# Patient Record
Sex: FEMALE | Race: WHITE | NOT HISPANIC OR LATINO | Employment: PART TIME | URBAN - NONMETROPOLITAN AREA
[De-identification: names, ages, dates, MRNs, and addresses within clinical notes are randomized per-mention and may not be internally consistent; named-entity substitution may affect disease eponyms.]

---

## 2017-01-30 ENCOUNTER — APPOINTMENT (OUTPATIENT)
Dept: OCCUPATIONAL MEDICINE | Facility: OTHER | Age: 37
End: 2017-01-30

## 2017-01-30 PROCEDURE — 99000 SPECIMEN HANDLING OFFICE-LAB: CPT

## 2017-02-03 ENCOUNTER — HOSPITAL ENCOUNTER (EMERGENCY)
Facility: HOSPITAL | Age: 37
Discharge: HOME | End: 2017-02-03
Attending: PHYSICIAN ASSISTANT | Admitting: PHYSICIAN ASSISTANT
Payer: COMMERCIAL

## 2017-02-03 VITALS
OXYGEN SATURATION: 97 % | SYSTOLIC BLOOD PRESSURE: 119 MMHG | TEMPERATURE: 96.7 F | HEART RATE: 89 BPM | DIASTOLIC BLOOD PRESSURE: 70 MMHG | RESPIRATION RATE: 16 BRPM

## 2017-02-03 DIAGNOSIS — H10.89 OTHER CONJUNCTIVITIS OF BOTH EYES: ICD-10-CM

## 2017-02-03 LAB
GRAM STN SPEC: ABNORMAL
MICRO REPORT STATUS: ABNORMAL
SPECIMEN SOURCE: ABNORMAL

## 2017-02-03 PROCEDURE — 99213 OFFICE O/P EST LOW 20 MIN: CPT

## 2017-02-03 PROCEDURE — 99213 OFFICE O/P EST LOW 20 MIN: CPT | Performed by: PHYSICIAN ASSISTANT

## 2017-02-03 PROCEDURE — 87070 CULTURE OTHR SPECIMN AEROBIC: CPT | Performed by: PHYSICIAN ASSISTANT

## 2017-02-03 PROCEDURE — 87205 SMEAR GRAM STAIN: CPT | Performed by: PHYSICIAN ASSISTANT

## 2017-02-03 RX ORDER — CETIRIZINE HYDROCHLORIDE 10 MG/1
TABLET ORAL
Qty: 30 TABLET | Refills: 0 | Status: SHIPPED | OUTPATIENT
Start: 2017-02-03

## 2017-02-03 ASSESSMENT — ENCOUNTER SYMPTOMS
EYE ITCHING: 1
EYE DISCHARGE: 1
SORE THROAT: 0
RESPIRATORY NEGATIVE: 1
PSYCHIATRIC NEGATIVE: 1
CHILLS: 0
NEUROLOGICAL NEGATIVE: 1
FEVER: 0
EYE REDNESS: 1
CONSTITUTIONAL NEGATIVE: 1
CARDIOVASCULAR NEGATIVE: 1
SINUS PRESSURE: 0

## 2017-02-03 NOTE — ED AVS SNAPSHOT
[facility]    [address] [place] [address]    Phone:  [phone]                                       Nella Stoner   MRN: [id number]    Department:  [facility]   Date of Visit:  2/3/2017           After Visit Summary Signature Page     I have received my discharge instructions, and my questions have been answered. I have discussed any challenges I see with this plan with the nurse or doctor.    ..........................................................................................................................................  Patient/Patient Representative Signature      ..........................................................................................................................................  Patient Representative Print Name and Relationship to Patient    ..................................................               ................................................  Date                                            Time    ..........................................................................................................................................  Reviewed by Signature/Title    ...................................................              ..............................................  Date                                                            Time

## 2017-02-03 NOTE — DISCHARGE INSTRUCTIONS
- Start with ointment for 5-7 days  - WASH HANDS A TON to prevent spreading it.  - May take zyrtec as directed on the bottle for 2-6 days to help with eye redness and itching.     * If no better over the weekend, see eye doc Monday/Tuesday. We will call with culture results if change in plan is needed.

## 2017-02-03 NOTE — ED AVS SNAPSHOT
[facility]    [address]    [place] [address]    Phone:  [phone]                                       Nella Stoner   MRN: [id number]    Department:  [facility]   Date of Visit:  2/3/2017           Patient Information     Date Of Birth          1980        Your diagnoses for this visit were:     Other conjunctivitis of both eyes        You were seen by Eduardo Sosa PA.      Follow-up Information     Follow up with None.    Why:  As needed. Eye doc Monday/Tuesday        Discharge Instructions       - Start with ointment for 5-7 days  - WASH HANDS A TON to prevent spreading it.  - May take zyrtec as directed on the bottle for 2-6 days to help with eye redness and itching.     * If no better over the weekend, see eye doc Monday/Tuesday. We will call with culture results if change in plan is needed.     Discharge References/Attachments     CONJUNCTIVITIS CAUSED BY INFECTION (ENGLISH)      ED Discharge Orders     OPHTHALMOLOGY ADULT REFERRAL       Your provider has referred you to: PREFERRED PROVIDERS: Per patient preference      Please be aware that coverage of these services is subject to the terms and limitations of your health insurance plan.  Call member services at your health plan with any benefit or coverage questions.      Please bring the following with you to your appointment:    (1) Any X-Rays, CTs or MRIs which have been performed.  Contact the facility where they were done to arrange for  prior to your scheduled appointment.    (2) List of current medications  (3) This referral request   (4) Any documents/labs given to you for this referral                     Review of your medicines      START taking        Dose / Directions Last dose taken    cetirizine 10 MG tablet   Commonly known as:  zyrTEC   Quantity:  30 tablet        Take 2 tabs (20 mg) twice daily for 2-3 days, then take 1 tab (10mg) twice daily for 2-3 days for itching/hives.  "  Refills:  0        gentamicin 0.3 % ophthalmic ointment   Commonly known as:  GARAMYCIN   Dose:  0.5 inch   Quantity:  1 Tube        Place 0.5 inches into both eyes 3 times daily for 7 days   Refills:  0                Prescriptions were sent or printed at these locations (2 Prescriptions)                   [id number] - [place], [place] - [address] AT [facility] [address]   [address], [place] [address]    Telephone:  [phone]   Fax:  [phone]   Hours:                  E-Prescribed (2 of 2)         gentamicin (GARAMYCIN) 0.3 % ophthalmic ointment               cetirizine (ZYRTEC) 10 MG tablet                Procedures and tests performed during your visit     Eye Culture Aerobic Bacterial    Gram stain      Orders Needing Specimen Collection     None      Pending Results     Date and Time Order Name Status Description    2/3/2017 1154 Gram stain In process     2/3/2017 1154 Eye Culture Aerobic Bacterial In process             Pending Culture Results     Date and Time Order Name Status Description    2/3/2017 1154 Gram stain In process     2/3/2017 1154 Eye Culture Aerobic Bacterial In process             Thank you for choosing [facility]       Thank you for choosing [facility] for your care. Our goal is always to provide you with excellent care. Hearing back from our patients is one way we can continue to improve our services. Please take a few minutes to complete the written survey that you may receive in the mail after you visit with us. Thank you!        [employer] Information     [employer] lets you send messages to your doctor, view your test results, renew your prescriptions, schedule appointments and more. To sign up, go to [email or web address] . Click on \"Log in\" on the left side of the screen, which will take you to the Welcome page. Then click on \"Sign up Now\" on the right side of the page.     You will be asked to enter the access code listed below, as well as some personal " information. Please follow the directions to create your username and password.     Your access code is: [email or web address]  Expires: 2017 11:56 AM     Your access code will  in 90 days. If you need help or a new code, please call your [place] clinic or [phone].        Care EveryWhere ID     This is your Care EveryWhere ID. This could be used by other organizations to access your [place] medical records  [id number]        After Visit Summary       This is your record. Keep this with you and show to your community pharmacist(s) and doctor(s) at your next visit.

## 2017-02-03 NOTE — ED PROVIDER NOTES
History     Chief Complaint   Patient presents with     Conjunctivitis     The history is provided by the patient. No  was used.     Nella Stoner is a 36 year old female who presents with 3rd episode conjuntivitis in 4 weeks. First episode was after her son had it from . 2nd episode was last week, so she started old drops, ran out, and was prescribed more by outside provider. This cleared them up for one day. Current episode started over past 24 hrs. She reports intense itching and discomfort in addition to mattering and yellow, thick discharge bilaterally. She has some nasal congestion. NO fevers, sore throat. No vision changes other than blurry with mattering.     I have reviewed the Medications, Allergies, Past Medical and Surgical History, and Social History in the Epic system.    Review of Systems   Constitutional: Negative.  Negative for fever and chills.   HENT: Positive for congestion. Negative for ear pain, sinus pressure and sore throat.    Eyes: Positive for discharge, redness, itching and visual disturbance (blurry with mattering).   Respiratory: Negative.    Cardiovascular: Negative.    Skin: Negative.    Neurological: Negative.    Psychiatric/Behavioral: Negative.        Physical Exam   BP: 119/70 mmHg  Pulse: 89  Temp: 96.7  F (35.9  C)  Resp: 16  SpO2: 97 %  Physical Exam   Constitutional: She is oriented to person, place, and time. She appears well-developed and well-nourished. No distress (obvious bilateral conjunctivitis).   HENT:   Head: Normocephalic and atraumatic.   Right Ear: External ear normal.   Left Ear: External ear normal.   Mouth/Throat: Oropharynx is clear and moist. No oropharyngeal exudate.   Eyes: EOM are normal. Pupils are equal, round, and reactive to light. Right eye exhibits discharge. Right eye exhibits no chemosis. Left eye exhibits discharge. Left eye exhibits no chemosis. Right conjunctiva is injected. Left conjunctiva is injected.    Neck: Normal range of motion.   Cardiovascular: Normal rate.    Pulmonary/Chest: Effort normal.   Lymphadenopathy:     She has cervical adenopathy (R anterior cervical palpable, <1 cm and mobile).   Neurological: She is alert and oriented to person, place, and time.   Skin: Skin is warm and dry.   Psychiatric: She has a normal mood and affect.   Nursing note and vitals reviewed.      ED Course   Procedures       Labs Ordered and Resulted from Time of ED Arrival Up to the Time of Departure from the ED   EYE CULTURE AEROBIC BACTERIAL   GRAM STAIN       Assessments & Plan (with Medical Decision Making)     I have reviewed the nursing notes.    I have reviewed the findings, diagnosis, plan and need for follow up with the patient.    Discharge Medication List as of 2/3/2017 12:18 PM      START taking these medications    Details   gentamicin (GARAMYCIN) 0.3 % ophthalmic ointment Place 0.5 inches into both eyes 3 times daily for 7 daysDisp-1 Tube, [id number]      cetirizine (ZYRTEC) 10 MG tablet Take 2 tabs (20 mg) twice daily for 2-3 days, then take 1 tab (10mg) twice daily for 2-3 days for itching/hives., Disp-30 tablet, R-0, E-Prescribe             Final diagnoses:   Other conjunctivitis of both eyes   Due to 3rd episode in 4 weeks, a culture is obtained. Pt will start gentamicin ointment, mindful hygiene, and f/u with ophthalmology Monday/Tuesday with poor progression. She will seek attention sooner with fevers or worsening. Patient verbally educated and given appropriate education sheets for each of the diagnoses and has no questions.    Eduardo Sosa PA-C   2/3/2017   12:29 PM    2/3/2017   [facility]      Eduardo Sosa PA  02/03/17 2626

## 2017-02-03 NOTE — ED NOTES
Pt here for redness both eyes, mostly Rt. Son had pink eye recently and was treated. She stated she used his drops first time, redness went away, then returned. She went in and got drops prescribed for herself, and it now has returned again. Itchy.

## 2017-02-07 LAB
BACTERIA SPEC CULT: NORMAL
MICRO REPORT STATUS: NORMAL
SPECIMEN SOURCE: NORMAL

## 2017-05-02 ENCOUNTER — APPOINTMENT (OUTPATIENT)
Dept: OCCUPATIONAL MEDICINE | Facility: OTHER | Age: 37
End: 2017-05-02

## 2017-05-02 PROCEDURE — 99000 SPECIMEN HANDLING OFFICE-LAB: CPT

## 2017-09-07 ENCOUNTER — HOSPITAL ENCOUNTER (EMERGENCY)
Facility: HOSPITAL | Age: 37
Discharge: HOME | End: 2017-09-07
Attending: FAMILY MEDICINE | Admitting: FAMILY MEDICINE
Payer: COMMERCIAL

## 2017-09-07 VITALS
HEART RATE: 76 BPM | SYSTOLIC BLOOD PRESSURE: 109 MMHG | OXYGEN SATURATION: 99 % | RESPIRATION RATE: 16 BRPM | TEMPERATURE: 96.1 F | DIASTOLIC BLOOD PRESSURE: 38 MMHG

## 2017-09-07 DIAGNOSIS — R82.5 POSITIVE URINE DRUG SCREEN: ICD-10-CM

## 2017-09-07 DIAGNOSIS — R56.9 SEIZURE-LIKE ACTIVITY (H): ICD-10-CM

## 2017-09-07 LAB
ALBUMIN SERPL-MCNC: 3 G/DL (ref 3.4–5)
ALBUMIN UR-MCNC: 10 MG/DL
ALP SERPL-CCNC: 79 U/L (ref 40–150)
ALT SERPL W P-5'-P-CCNC: 16 U/L (ref 0–50)
AMPHETAMINES UR QL SCN: POSITIVE
ANION GAP SERPL CALCULATED.3IONS-SCNC: 5 MMOL/L (ref 3–14)
APPEARANCE UR: ABNORMAL
AST SERPL W P-5'-P-CCNC: 10 U/L (ref 0–45)
BACTERIA #/AREA URNS HPF: ABNORMAL /HPF
BARBITURATES UR QL: NEGATIVE
BASOPHILS # BLD AUTO: 0 10E9/L (ref 0–0.2)
BASOPHILS NFR BLD AUTO: 0.5 %
BENZODIAZ UR QL: NEGATIVE
BILIRUB SERPL-MCNC: 0.3 MG/DL (ref 0.2–1.3)
BILIRUB UR QL STRIP: NEGATIVE
BUN SERPL-MCNC: 10 MG/DL (ref 7–30)
CALCIUM SERPL-MCNC: 7.8 MG/DL (ref 8.5–10.1)
CANNABINOIDS UR QL SCN: NEGATIVE
CHLORIDE SERPL-SCNC: 106 MMOL/L (ref 94–109)
CO2 SERPL-SCNC: 32 MMOL/L (ref 20–32)
COCAINE UR QL: NEGATIVE
COLOR UR AUTO: YELLOW
CREAT SERPL-MCNC: 0.65 MG/DL (ref 0.52–1.04)
DIFFERENTIAL METHOD BLD: ABNORMAL
EOSINOPHIL # BLD AUTO: 0.1 10E9/L (ref 0–0.7)
EOSINOPHIL NFR BLD AUTO: 2.1 %
ERYTHROCYTE [DISTWIDTH] IN BLOOD BY AUTOMATED COUNT: 13.1 % (ref 10–15)
ETHANOL SERPL-MCNC: <0.01 G/DL
GFR SERPL CREATININE-BSD FRML MDRD: >90 ML/MIN/1.7M2
GLUCOSE SERPL-MCNC: 88 MG/DL (ref 70–99)
GLUCOSE UR STRIP-MCNC: NEGATIVE MG/DL
HCG UR QL: NEGATIVE
HCT VFR BLD AUTO: 35.2 % (ref 35–47)
HGB BLD-MCNC: 12.5 G/DL (ref 11.7–15.7)
HGB UR QL STRIP: NEGATIVE
IMM GRANULOCYTES # BLD: 0 10E9/L (ref 0–0.4)
IMM GRANULOCYTES NFR BLD: 0.3 %
KETONES UR STRIP-MCNC: NEGATIVE MG/DL
LACTATE SERPL-SCNC: 1.3 MMOL/L (ref 0.4–2)
LEUKOCYTE ESTERASE UR QL STRIP: ABNORMAL
LYMPHOCYTES # BLD AUTO: 1.4 10E9/L (ref 0.8–5.3)
LYMPHOCYTES NFR BLD AUTO: 36.9 %
MAGNESIUM SERPL-MCNC: 1.8 MG/DL (ref 1.6–2.3)
MCH RBC QN AUTO: 31.5 PG (ref 26.5–33)
MCHC RBC AUTO-ENTMCNC: 35.5 G/DL (ref 31.5–36.5)
MCV RBC AUTO: 89 FL (ref 78–100)
METHADONE UR QL SCN: NEGATIVE
MONOCYTES # BLD AUTO: 0.5 10E9/L (ref 0–1.3)
MONOCYTES NFR BLD AUTO: 13.7 %
MUCOUS THREADS #/AREA URNS LPF: PRESENT /LPF
NEUTROPHILS # BLD AUTO: 1.8 10E9/L (ref 1.6–8.3)
NEUTROPHILS NFR BLD AUTO: 46.5 %
NITRATE UR QL: NEGATIVE
NRBC # BLD AUTO: 0 10*3/UL
NRBC BLD AUTO-RTO: 0 /100
OPIATES UR QL SCN: NEGATIVE
PCP UR QL SCN: NEGATIVE
PH UR STRIP: 5.5 PH (ref 4.7–8)
PLATELET # BLD AUTO: 170 10E9/L (ref 150–450)
POTASSIUM SERPL-SCNC: 3.3 MMOL/L (ref 3.4–5.3)
PROT SERPL-MCNC: 5.7 G/DL (ref 6.8–8.8)
RBC # BLD AUTO: 3.97 10E12/L (ref 3.8–5.2)
RBC #/AREA URNS AUTO: 2 /HPF (ref 0–2)
SODIUM SERPL-SCNC: 143 MMOL/L (ref 133–144)
SOURCE: ABNORMAL
SP GR UR STRIP: 1.02 (ref 1–1.03)
SQUAMOUS #/AREA URNS AUTO: 25 /HPF (ref 0–1)
UROBILINOGEN UR STRIP-MCNC: 2 MG/DL (ref 0–2)
WBC # BLD AUTO: 3.9 10E9/L (ref 4–11)
WBC #/AREA URNS AUTO: 9 /HPF (ref 0–2)

## 2017-09-07 PROCEDURE — 81001 URINALYSIS AUTO W/SCOPE: CPT | Mod: 59 | Performed by: FAMILY MEDICINE

## 2017-09-07 PROCEDURE — 80307 DRUG TEST PRSMV CHEM ANLYZR: CPT | Performed by: FAMILY MEDICINE

## 2017-09-07 PROCEDURE — 99283 EMERGENCY DEPT VISIT LOW MDM: CPT

## 2017-09-07 PROCEDURE — 80320 DRUG SCREEN QUANTALCOHOLS: CPT | Performed by: FAMILY MEDICINE

## 2017-09-07 PROCEDURE — 83735 ASSAY OF MAGNESIUM: CPT | Performed by: FAMILY MEDICINE

## 2017-09-07 PROCEDURE — 36415 COLL VENOUS BLD VENIPUNCTURE: CPT | Performed by: FAMILY MEDICINE

## 2017-09-07 PROCEDURE — 81025 URINE PREGNANCY TEST: CPT | Performed by: FAMILY MEDICINE

## 2017-09-07 PROCEDURE — 85025 COMPLETE CBC W/AUTO DIFF WBC: CPT | Performed by: FAMILY MEDICINE

## 2017-09-07 PROCEDURE — 80053 COMPREHEN METABOLIC PANEL: CPT | Performed by: FAMILY MEDICINE

## 2017-09-07 PROCEDURE — 83605 ASSAY OF LACTIC ACID: CPT | Performed by: FAMILY MEDICINE

## 2017-09-07 PROCEDURE — 99284 EMERGENCY DEPT VISIT MOD MDM: CPT | Performed by: FAMILY MEDICINE

## 2017-09-07 ASSESSMENT — ENCOUNTER SYMPTOMS
NERVOUS/ANXIOUS: 1
CONSTIPATION: 0
FATIGUE: 1
DIARRHEA: 0
SHORTNESS OF BREATH: 0
VOMITING: 0
SEIZURES: 1
ACTIVITY CHANGE: 1
WOUND: 1
ABDOMINAL PAIN: 0
MUSCULOSKELETAL NEGATIVE: 1
NAUSEA: 0
ROS SKIN COMMENTS: SEE HPI.
DYSURIA: 0
DYSPHORIC MOOD: 1

## 2017-09-07 NOTE — ED NOTES
ewa Velazquezer from [facility] called back to say she received my fax.  She passed on the information and is waiting to hear back.  She wanted information on ages and where kids are, advised we are not aware of that information as pt is not giving us any information.

## 2017-09-07 NOTE — ED NOTES
2nd RN attempted IV with no success.  Dr Jose M Leon updated.  Pt was offered water, refused and is drinking her gatorade.  Pt continues to have a hard time keeping her eyes open when talking to staff.  Called [facility] [phone] and made a verbal report.  Per Screener request copy of positive urine result and Prenatal Exposure filed and faxed to [phone], received confirmation fax.

## 2017-09-07 NOTE — ED NOTES
"When talking to pt, she can't keep her eyes open, she told me if you walk into the room and my eyes are closed \"I'm having a seizure, this has been happening for along time and I just realized today that's what's going on.  Pt is panicking because she can't find her phone in the bed linens.  Call light in reach, seizure pads on bed rail.  "

## 2017-09-07 NOTE — ED AVS SNAPSHOT
" [facility]    [address] [address]    Phone:  [phone]                                       Nella Stoner   MRN: [id number]    Department[facility]  [facility]   Date of Visit:  9/7/2017           Patient Information     Date Of Birth          1980        Your diagnoses for this visit were:     Seizure-like activity (H)     Positive urine drug screen        You were seen by Tenisha Costa MD.      Follow-up Information     Follow up with Iram Wei MD.    Specialty:  Family Practice    Why:  As needed    Contact information:    [facility]  [address] [place] [id number] [phone]          Discharge Instructions           Seizure: New Onset, Unknown Cause (Adult)  You have had a seizure today. A seizure happens when a burst of random, uncontrolled electrical activity occurs in the brain. A seizure can have many causes. Often it s not possible to figure out the exact cause of a seizure from a single exam. You might need other tests. Having a single seizure doesn t mean that you will continue to have seizures or that you have epilepsy. But until doctors know the cause of your seizure, you should assume that another seizure is possible.  Home care  Follow these tips when caring for yourself at home. For this seizure:    Seizures aren t predictable. So avoid doing anything that might cause danger to you or other people if you have another seizure. Don t drive, ride a bike, or operate dangerous equipment.    Don t take a bath alone. Take a shower instead.    Don t swim alone until your healthcare provider says that you are no longer in danger of having another seizure.    Tell your close friends and relatives about your seizure. Teach them what to do for you if it happens again.    If medicine was prescribed to prevent seizures, take it exactly as directed. It does not work when taken \"as needed.\" Missing doses will increase the " risk of having another seizure.    Follow a regular sleep schedule such that you get at least 6 to 8 hours of restful sleep every night. This is especially important when you are sick and have a cold, flu, or another type of infection.    Don't drink alcoholic beverages until your doctor says it's OK. Do not ever use recreational drugs.  For future seizures, if you are alone:  If you feel a seizure coming on, lie down on a bed or on the floor with something soft under your head. Lie on your left side, not on your back. This will keep you from falling. It will also let fluid drain out of your mouth and prevent choking. Be sure you are clear of any objects that might injure you during the seizure. Call 911 if there is time.  For future seizures, if someone is with you:  The person should help you get into a safe position and call 911. The person shouldn t try to force anything in your mouth once the seizure begins. This could harm your teeth or jaw.  After a seizure, you may be drowsy or confused. The person should stay with you until you are fully awake. The person shouldn t offer you anything to eat or drink during that time. Call 911 or go to the emergency department so that you can be looked at.  Follow-up care  Follow up with your healthcare provider, or as advised. You may need other tests to help figure out what caused your seizure. These tests may include brain wave tests (EEG) or brain scans (MRI or CT scans). Keep a seizure calendar to record how often you have a seizure. If you are being started on anti-seizure medicine, make sure that you use additional birth control protection. Seizure medicine can affect how well birth control pills work, and you could become pregnant. Don't drink alcohol until your doctor tells you it s OK. Do not ever use recreational drugs.  Note: For the safety of yourself and others on the road, certain states require that the treating doctor tell the Public Health Department about  any adult who is treated for a seizure and is at risk of more seizures. In this case, the Department of Motor Vehicles will be told. A restriction will be put on your  s license until a doctor gives you medical clearance to drive again. Contact your treating doctor to find out if your state requires the reporting of patients with a seizures condition.  When to seek medical advice  Call your healthcare provider right away if any of these occur:    Another seizure    Fever over 100.4 F (38.0 C), or as advised    Unusual irritability, drowsiness, or confusion    Headache or neck pain that gets worse  Date Last Reviewed: 8/1/2016 2000-2017 [employer]. [address] [place] [address]. All rights reserved. This information is not intended as a substitute for professional medical care. Always follow your healthcare professional's instructions.      Wed September 13, 2017  8:00 a.m.  EEG  Follow up for seizures  [facility]  [address] [place] [address]  [phone]    Thursday October 5, 2017  1:30 P.M.  Dr Hema Vigil, Neurologist  [address] [place] [address]  [place], [place]  [phone]       Review of your medicines      Our records show that you are taking the medicines listed below. If these are incorrect, please call your family doctor or clinic.        Dose / Directions Last dose taken    cetirizine 10 MG tablet   Commonly known as:  zyrTEC   Quantity:  30 tablet        Take 2 tabs (20 mg) twice daily for 2-3 days, then take 1 tab (10mg) twice daily for 2-3 days for itching/hives.   Refills:  0        SUDAFED PO   Dose:  30 mg        Take 30 mg by mouth   Refills:  0                Procedures and tests performed during your visit     CBC with platelets differential    Cardiac Continuous Monitoring    Comprehensive metabolic panel    Drug Screen Urine (Range)    Ethanol level    HCG qualitative urine    Lactic acid    Magnesium    Neuro checks    Pulse oximetry nursing    UA  "reflex to Microscopic and Culture      Orders Needing Specimen Collection     None      Pending Results     No orders found from 2017 to 2017.            Pending Culture Results     No orders found from 2017 to 2017.            Thank you for choosing [facility]       Thank you for choosing [facility] for your care. Our goal is always to provide you with excellent care. Hearing back from our patients is one way we can continue to improve our services. Please take a few minutes to complete the written survey that you may receive in the mail after you visit with us. Thank you!        [email or web address] Information     [email or web address] lets you send messages to your doctor, view your test results, renew your prescriptions, schedule appointments and more. To sign up, go to [email or web address] . Click on \"Log in\" on the left side of the screen, which will take you to the Welcome page. Then click on \"Sign up Now\" on the right side of the page.     You will be asked to enter the access code listed below, as well as some personal information. Please follow the directions to create your username and password.     Your access code is: [email or web address]  Expires: 2017 12:20 PM     Your access code will  in 90 days. If you need help or a new code, please call your [facility] clinic or [phone].        Care EveryWhere ID     This is your Care EveryWhere ID. This could be used by other organizations to access your [facility] medical records  [id number]        Equal Access to Services     DEBI SIERRA AH: Hadii aries Caldera, waaxda lujagdeepadaha, qaybta kaalmada ladi, mauri muñoz. So [facility] [phone].    ATENCIÓN: Si habla español, tiene a camarillo disposición servicios gratuitos de asistencia lingüística. Llame al [phone].    We comply with applicable federal civil rights laws and [place] laws. We do not discriminate on the basis of race, color, national origin, age, disability sex, " sexual orientation or gender identity.            After Visit Summary       This is your record. Keep this with you and show to your community pharmacist(s) and doctor(s) at your next visit.

## 2017-09-07 NOTE — DISCHARGE INSTRUCTIONS
"    Seizure: New Onset, Unknown Cause (Adult)  You have had a seizure today. A seizure happens when a burst of random, uncontrolled electrical activity occurs in the brain. A seizure can have many causes. Often it s not possible to figure out the exact cause of a seizure from a single exam. You might need other tests. Having a single seizure doesn t mean that you will continue to have seizures or that you have epilepsy. But until doctors know the cause of your seizure, you should assume that another seizure is possible.  Home care  Follow these tips when caring for yourself at home. For this seizure:    Seizures aren t predictable. So avoid doing anything that might cause danger to you or other people if you have another seizure. Don t drive, ride a bike, or operate dangerous equipment.    Don t take a bath alone. Take a shower instead.    Don t swim alone until your healthcare provider says that you are no longer in danger of having another seizure.    Tell your close friends and relatives about your seizure. Teach them what to do for you if it happens again.    If medicine was prescribed to prevent seizures, take it exactly as directed. It does not work when taken \"as needed.\" Missing doses will increase the risk of having another seizure.    Follow a regular sleep schedule such that you get at least 6 to 8 hours of restful sleep every night. This is especially important when you are sick and have a cold, flu, or another type of infection.    Don't drink alcoholic beverages until your doctor says it's OK. Do not ever use recreational drugs.  For future seizures, if you are alone:  If you feel a seizure coming on, lie down on a bed or on the floor with something soft under your head. Lie on your left side, not on your back. This will keep you from falling. It will also let fluid drain out of your mouth and prevent choking. Be sure you are clear of any objects that might injure you during the seizure. Call 911 if " there is time.  For future seizures, if someone is with you:  The person should help you get into a safe position and call 911. The person shouldn t try to force anything in your mouth once the seizure begins. This could harm your teeth or jaw.  After a seizure, you may be drowsy or confused. The person should stay with you until you are fully awake. The person shouldn t offer you anything to eat or drink during that time. Call 911 or go to the emergency department so that you can be looked at.  Follow-up care  Follow up with your healthcare provider, or as advised. You may need other tests to help figure out what caused your seizure. These tests may include brain wave tests (EEG) or brain scans (MRI or CT scans). Keep a seizure calendar to record how often you have a seizure. If you are being started on anti-seizure medicine, make sure that you use additional birth control protection. Seizure medicine can affect how well birth control pills work, and you could become pregnant. Don't drink alcohol until your doctor tells you it s OK. Do not ever use recreational drugs.  Note: For the safety of yourself and others on the road, certain states require that the treating doctor tell the Public Health Department about any adult who is treated for a seizure and is at risk of more seizures. In this case, the Department of Motor Vehicles will be told. A restriction will be put on your  s license until a doctor gives you medical clearance to drive again. Contact your treating doctor to find out if your state requires the reporting of patients with a seizures condition.  When to seek medical advice  Call your healthcare provider right away if any of these occur:    Another seizure    Fever over 100.4 F (38.0 C), or as advised    Unusual irritability, drowsiness, or confusion    Headache or neck pain that gets worse  Date Last Reviewed: 8/1/2016 2000-2017 [employer]. [address] [place] [address], [place]  [address]. All rights reserved. This information is not intended as a substitute for professional medical care. Always follow your healthcare professional's instructions.      Wed September 13, 2017  8:00 a.m.  EEG  Follow up for seizures  [address]  [phone]    Thursday October 5, 2017  1:30 P.M.  Dr Hema Vigil, Neurologist  [address] [place] [address]-[address]  [place], [place]  [phone]

## 2017-09-07 NOTE — ED PROVIDER NOTES
"  History     Chief Complaint   Patient presents with     Seizures     HPI  Nella Stoner is a 37 year old female who is here due to \"seizures\" she has had, possibly for \"years\", that she just recognized as something that was unusual.  She did not lose control of bowel or bladder, states she had consciousness, but could not move, and that there was involuntary movement of her head and possibly left arm.  She states she has had these spells previously but never identified them.  She is quite somnolent, urine testing did show positive for amphetamine.  She denies having used any meth or amphetamines in \"a long time\".  She has no other complaints.  She does have open sores on her neck and face.    I have reviewed the Medications, Allergies, Past Medical and Surgical History, and Social History in the Epic system.    Allergies: No Known Allergies      Current Facility-Administered Medications on File Prior to Encounter:  lidocaine-EPINEPHrine 1.5 %-[id number] injection   fentaNYL 2 mcg/mL ropivacaine 0.2% in NS BOLUS   fentaNYL (SUBLIMAZE) injection   fentaNYL (SUBLIMAZE) 2 mcg/mL ropivacaine (NAROPIN) 0.2% in  mL EPIDURAL Drip     Current Outpatient Prescriptions on File Prior to Encounter:  cetirizine (ZYRTEC) 10 MG tablet Take 2 tabs (20 mg) twice daily for 2-3 days, then take 1 tab (10mg) twice daily for 2-3 days for itching/hives.       Patient Active Problem List   Diagnosis     Threatened labor     Active labor       Past Surgical History:   Procedure Laterality Date     SURGICAL PATHOLOGY EXAM      S/P LEEP of Cervix       Social History   Substance Use Topics     Smoking status: Current Every Day Smoker     Packs/day: 0.50     Years: 10.00     Smokeless tobacco: Never Used     Alcohol use No       There is no immunization history for the selected administration types on file for this patient.    BMI: Estimated body mass index is 27 kg/(m^2) as calculated from the following:    Height as of 10/7/14: " "1.715 m (5' 7.5\").    Weight as of 10/7/14: 79.4 kg (175 lb).      Review of Systems   Constitutional: Positive for activity change and fatigue.   HENT: Negative.    Respiratory: Negative for shortness of breath.    Cardiovascular: Negative for chest pain.   Gastrointestinal: Negative for abdominal pain, constipation, diarrhea, nausea and vomiting.   Genitourinary: Negative for dysuria.   Musculoskeletal: Negative.    Skin: Positive for wound.        See HPI.   Neurological: Positive for seizures.        See HPI   Psychiatric/Behavioral: Positive for dysphoric mood. The patient is nervous/anxious.        Physical Exam   BP: (!) 109/38  Pulse: 76  Temp: 96.1  F (35.6  C)  Resp: 16  SpO2: 99 %  Physical Exam   Constitutional: She is oriented to person, place, and time. She appears well-developed and well-nourished.   HENT:   Head: Normocephalic and atraumatic.   Neck: Normal range of motion. Neck supple.   Cardiovascular: Normal rate, regular rhythm, normal heart sounds and intact distal pulses.    No murmur heard.  Pulmonary/Chest: Effort normal and breath sounds normal. No respiratory distress.   Abdominal: Soft. Bowel sounds are normal. She exhibits no distension. There is no tenderness.   Musculoskeletal: Normal range of motion. She exhibits no edema or tenderness.   Neurological: She is alert and oriented to person, place, and time. No cranial nerve deficit. GCS eye subscore is 4. GCS verbal subscore is 5. GCS motor subscore is 6.   Intermittently somnolent   Skin: Skin is warm and dry. Lesion noted.        Psychiatric: Judgment normal. Her mood appears anxious. Her affect is blunt. Her speech is slurred. Cognition and memory are normal.   Speech slurring when just awakening.   Nursing note and vitals reviewed.      ED Course     ED Course     Procedures        Labs Ordered and Resulted from Time of ED Arrival Up to the Time of Departure from the ED   DRUG SCREEN URINE (RANGE) - Abnormal; Notable for the " following:        Result Value    Amphetamine Qual Urine Positive (*)     All other components within normal limits   UA MACROSCOPIC WITH REFLEX TO MICRO AND CULTURE - Abnormal; Notable for the following:     Protein Albumin Urine 10 (*)     Leukocyte Esterase Urine Moderate (*)     WBC Urine 9 (*)     Bacteria Urine None (*)     Squamous Epithelial /HPF Urine 25 (*)     Mucous Urine Present (*)     All other components within normal limits   CBC WITH PLATELETS DIFFERENTIAL - Abnormal; Notable for the following:     WBC 3.9 (*)     All other components within normal limits   COMPREHENSIVE METABOLIC PANEL - Abnormal; Notable for the following:     Potassium 3.3 (*)     Calcium 7.8 (*)     Albumin 3.0 (*)     Protein Total 5.7 (*)     All other components within normal limits   HCG QUALITATIVE URINE   ALCOHOL ETHYL   MAGNESIUM   LACTIC ACID   NEURO CHECKS   CARDIAC CONTINUOUS MONITORING   PULSE OXIMETRY NURSING   PERIPHERAL IV CATHETER   GLUCOSE MONITOR NURSING POCT       Assessments & Plan (with Medical Decision Making)   Patient discussed with Dr. Glez at [facility] who recommended EEG and neuro followup after that is completed.  Appointments for both made at [facility], patient informed of these and will be discharged on no new medications (also per neuro recommendation) and advised that anything she takes that lowers the seizure threshold could make this worse.  Again, patient denies meth use.    I have reviewed the nursing notes.    I have reviewed the findings, diagnosis, plan and need for follow up with the patient.       New Prescriptions    No medications on file       Final diagnoses:   Seizure-like activity (H)       9/7/2017   [facility]     Tenisha Costa MD  09/07/17 2013

## 2017-09-07 NOTE — ED NOTES
Pt presents with c/o having small seizures for years but is just realizing it now, pt was sent over from .

## 2017-09-07 NOTE — ED NOTES
" done talking with pt.  Pt came up to desk wanting her discharge papers.  I was going to over with her \"I can read my own papers\" and grabbed them out of my hand and stormed out the door.    "

## 2017-09-07 NOTE — ED AVS SNAPSHOT
[facility]    [address] [place] [id number]    Phone:  [phone]                                       Nella Stoner   MRN: [id number]    Department:  [facility]   Date of Visit:  9/7/2017           After Visit Summary Signature Page     I have received my discharge instructions, and my questions have been answered. I have discussed any challenges I see with this plan with the nurse or doctor.    ..........................................................................................................................................  Patient/Patient Representative Signature      ..........................................................................................................................................  Patient Representative Print Name and Relationship to Patient    ..................................................               ................................................  Date                                            Time    ..........................................................................................................................................  Reviewed by Signature/Title    ...................................................              ..............................................  Date                                                            Time

## 2017-09-07 NOTE — ED NOTES
"Attempted one IV start, pt was crying hysterically \"I can't stand handle this.\"  Pt is refusing blood glucose or any further IV starts.  Dr Jose M Leon notified.  "

## 2017-11-26 ENCOUNTER — HEALTH MAINTENANCE LETTER (OUTPATIENT)
Age: 37
End: 2017-11-26

## 2018-06-29 ENCOUNTER — APPOINTMENT (OUTPATIENT)
Dept: OCCUPATIONAL MEDICINE | Facility: OTHER | Age: 38
End: 2018-06-29

## 2018-06-29 PROCEDURE — 99000 SPECIMEN HANDLING OFFICE-LAB: CPT

## 2022-08-22 ENCOUNTER — HOSPITAL ENCOUNTER (EMERGENCY)
Facility: HOSPITAL | Age: 42
Discharge: HOME | End: 2022-08-22
Attending: EMERGENCY MEDICINE | Admitting: EMERGENCY MEDICINE
Payer: COMMERCIAL

## 2022-08-22 VITALS
WEIGHT: 120 LBS | TEMPERATURE: 97.5 F | BODY MASS INDEX: 18.83 KG/M2 | SYSTOLIC BLOOD PRESSURE: 111 MMHG | DIASTOLIC BLOOD PRESSURE: 62 MMHG | HEIGHT: 67 IN | RESPIRATION RATE: 14 BRPM | HEART RATE: 54 BPM | OXYGEN SATURATION: 96 %

## 2022-08-22 DIAGNOSIS — K91.840 SURGICAL WOUND HEMORRHAGE AFTER DENTAL PROCEDURE: ICD-10-CM

## 2022-08-22 DIAGNOSIS — Z98.818 SURGICAL WOUND HEMORRHAGE AFTER DENTAL PROCEDURE: ICD-10-CM

## 2022-08-22 PROCEDURE — 250N000009 HC RX 250: Performed by: EMERGENCY MEDICINE

## 2022-08-22 PROCEDURE — 99282 EMERGENCY DEPT VISIT SF MDM: CPT | Performed by: EMERGENCY MEDICINE

## 2022-08-22 PROCEDURE — 99283 EMERGENCY DEPT VISIT LOW MDM: CPT

## 2022-08-22 RX ORDER — TRANEXAMIC ACID 100 MG/ML
INJECTION, SOLUTION INTRAVENOUS ONCE
Status: COMPLETED | OUTPATIENT
Start: 2022-08-22 | End: 2022-08-22

## 2022-08-22 RX ORDER — BUPRENORPHINE AND NALOXONE 8; 2 MG/1; MG/1
1 FILM, SOLUBLE BUCCAL; SUBLINGUAL 3 TIMES DAILY
COMMUNITY

## 2022-08-22 RX ORDER — TRANEXAMIC ACID 10 MG/ML
1 INJECTION, SOLUTION INTRAVENOUS ONCE
Status: DISCONTINUED | OUTPATIENT
Start: 2022-08-22 | End: 2022-08-22 | Stop reason: CLARIF

## 2022-08-22 RX ADMIN — TRANEXAMIC ACID 1000 MG: 1 INJECTION, SOLUTION INTRAVENOUS at 19:42

## 2022-08-22 RX ADMIN — TRANEXAMIC ACID 1000 MG: 1 INJECTION, SOLUTION INTRAVENOUS at 19:25

## 2022-08-22 ASSESSMENT — ACTIVITIES OF DAILY LIVING (ADL): ADLS_ACUITY_SCORE: 37

## 2022-08-23 ASSESSMENT — ENCOUNTER SYMPTOMS
COUGH: 0
SHORTNESS OF BREATH: 0
FEVER: 0
CHILLS: 0

## 2022-08-23 NOTE — ED TRIAGE NOTES
Pt and significant other report having teeth removed today and dentures placed this afternoon. Pt reports she has been bleeding since she left the facility which was approximately 2.5 hours ago.

## 2022-08-23 NOTE — ED PROVIDER NOTES
"  History     Chief Complaint   Patient presents with     Dental Problem     HPI  Nella Stoner is a 42 year old female who is here with bleeding from mouth.  Had teeth removed and dentures.  Done at outside facility.  Shortly after leaving, bleeding got worse they returned and they were told to leave because they were closed.  Patient's significant other brought her here for evaluation.  Bleeding has been persistent.  No chest pain no sensation of being lightheaded or difficulty breathing.  No other complaints at this time.  Patient without history of severe bleeding in the past does not take any blood thinners.    Allergies:  No Known Allergies    Problem List:    Patient Active Problem List    Diagnosis Date Noted     Active labor 10/07/2014     Priority: Medium     Threatened labor 10/04/2014     Priority: Medium        Past Medical History:    Past Medical History:   Diagnosis Date     Anxiety      Drug abuse        Past Surgical History:    Past Surgical History:   Procedure Laterality Date     SURGICAL PATHOLOGY EXAM      S/P LEEP of Cervix       Family History:    No family history on file.    Social History:  Marital Status:  Single [1]  Social History     Tobacco Use     Smoking status: Current Every Day Smoker     Packs/day: 0.50     Years: 10.00     Pack years: 5.00     Smokeless tobacco: Never Used   Substance Use Topics     Alcohol use: No     Drug use: Yes     Types: Methamphetamines     Comment: 1 yr ago        Medications:    buprenorphine HCl-naloxone HCl (SUBOXONE) 8-2 MG per film  cetirizine (ZYRTEC) 10 MG tablet  Pseudoephedrine HCl (SUDAFED PO)          Review of Systems   Constitutional: Negative for chills and fever.   Respiratory: Negative for cough and shortness of breath.    All other systems reviewed and are negative.      Physical Exam   BP: 123/76  Pulse: 60  Temp: 97.5  F (36.4  C)  Resp: 20  Height: 170.2 cm (5' 7\")  Weight: 54.4 kg (120 lb)  SpO2: 99 %      Physical " Exam  Constitutional:       General: She is not in acute distress.     Appearance: She is not diaphoretic.   HENT:      Head: Normocephalic and atraumatic.      Right Ear: External ear normal.      Left Ear: External ear normal.      Nose: No congestion or rhinorrhea.      Mouth/Throat:      Pharynx: Oropharynx is clear. No oropharyngeal exudate.      Comments: Blood pooling in lower jaw, no obvious source, dentures in place, per patient was told not to remove dentures  Eyes:      General: No scleral icterus.     Pupils: Pupils are equal, round, and reactive to light.   Cardiovascular:      Rate and Rhythm: Normal rate and regular rhythm.      Heart sounds: Normal heart sounds.   Pulmonary:      Effort: No respiratory distress.      Breath sounds: Normal breath sounds.   Abdominal:      General: Bowel sounds are normal.      Palpations: Abdomen is soft.      Tenderness: There is no abdominal tenderness.   Musculoskeletal:         General: No tenderness.      Cervical back: Normal range of motion and neck supple.      Right lower leg: No edema.      Left lower leg: No edema.   Skin:     General: Skin is warm.      Capillary Refill: Capillary refill takes less than 2 seconds.      Findings: No rash.   Neurological:      Mental Status: Mental status is at baseline.      Cranial Nerves: No cranial nerve deficit.   Psychiatric:         Mood and Affect: Mood normal.         Behavior: Behavior normal.         ED Course                 Procedures             Critical Care time:               No results found for this or any previous visit (from the past 24 hour(s)).    Medications   tranexamic acid (CYKLOKAPRON) TOPICAL (injection used topically) (1,000 mg Topical Given 8/22/22 1925)   tranexamic acid (CYKLOKAPRON) TOPICAL (injection used topically) (1,000 mg Topical Given 8/22/22 1942)       Assessments & Plan (with Medical Decision Making)     I have reviewed the nursing notes.    I have reviewed the findings, diagnosis,  plan and need for follow up with the patient.  42-year-old female here with bleeding after dental procedure.  Bleeding dramatically improved after tranexamic acid applied to gauze Kerlix in place in mouth.  Was observed for some time, bleeding markedly improved, patient and significant other thigh bleeding, or rate of bleeding, was acceptable and are okay to be discharged home.  Were given gauze.    Discharge Medication List as of 8/22/2022  8:55 PM          Final diagnoses:   Surgical wound hemorrhage after dental procedure       8/22/2022   [facility]     Philippe Abraham MD  08/23/22 1818

## 2022-08-23 NOTE — DISCHARGE INSTRUCTIONS
Use 40ml of liquid ibuprofen (Children's) every 8 hours for pain. When you can take pills, use them instead.